# Patient Record
Sex: FEMALE | Race: BLACK OR AFRICAN AMERICAN | NOT HISPANIC OR LATINO | Employment: FULL TIME | URBAN - METROPOLITAN AREA
[De-identification: names, ages, dates, MRNs, and addresses within clinical notes are randomized per-mention and may not be internally consistent; named-entity substitution may affect disease eponyms.]

---

## 2023-05-17 LAB
ALANINE AMINOTRANSFERASE (SGPT) (U/L) IN SER/PLAS: 16 U/L (ref 7–45)
ALBUMIN (G/DL) IN SER/PLAS: 3.7 G/DL (ref 3.4–5)
ALKALINE PHOSPHATASE (U/L) IN SER/PLAS: 63 U/L (ref 33–110)
AMORPHOUS CRYSTALS, URINE: NORMAL /HPF
ANION GAP IN SER/PLAS: 13 MMOL/L (ref 10–20)
ANTI-DNA (DS): 1 IU/ML
ANTICARDIOLIPIN IGA ANTIBODY: 1.2 APL U/ML (ref 0–20)
ANTICARDIOLIPIN IGG ANTIBODY: <1.6 GPL U/ML (ref 0–20)
ANTICARDIOLIPIN IGM ANTIBODY: <0.2 MPL U/ML (ref 0–20)
APPEARANCE, URINE: ABNORMAL
ASPARTATE AMINOTRANSFERASE (SGOT) (U/L) IN SER/PLAS: 21 U/L (ref 9–39)
BACTERIA, URINE: NORMAL /HPF
BASOPHILS (10*3/UL) IN BLOOD BY AUTOMATED COUNT: 0.01 X10E9/L (ref 0–0.1)
BASOPHILS/100 LEUKOCYTES IN BLOOD BY AUTOMATED COUNT: 0.2 % (ref 0–2)
BETA 2 GLYCOPROTEIN 1 IGA AB IN SERUM: 1.3 U/ML (ref 0–20)
BETA 2 GLYCOPROTEIN 1 IGG AB IN SERUM: <1.4 U/ML (ref 0–20)
BETA 2 GLYCOPROTEIN 1 IGM ANTIBODY IN SERUM: <0.2 U/ML (ref 0–20)
BILIRUBIN TOTAL (MG/DL) IN SER/PLAS: 0.2 MG/DL (ref 0–1.2)
BILIRUBIN, URINE: NEGATIVE
BLOOD, URINE: NEGATIVE
BROAD CASTS, URINE: NORMAL /LPF
BUDDING YEAST, URINE: NORMAL /HPF
C REACTIVE PROTEIN (MG/L) IN SER/PLAS: 0.32 MG/DL
CALCIUM (MG/DL) IN SER/PLAS: 9.6 MG/DL (ref 8.6–10.6)
CALCIUM CARBONATE CRYSTALS, URINE: NORMAL /HPF
CALCIUM OXALATE CRYSTALS, URINE: NORMAL /HPF
CALCIUM PHOSPHATE CRYSTALS, URINE: NORMAL /HPF
CARBON DIOXIDE, TOTAL (MMOL/L) IN SER/PLAS: 24 MMOL/L (ref 21–32)
CHLORIDE (MMOL/L) IN SER/PLAS: 104 MMOL/L (ref 98–107)
COLOR, URINE: YELLOW
COMPLEMENT C3 (MG/DL) IN SER/PLAS: 153 MG/DL (ref 87–200)
COMPLEMENT C4 (MG/DL) IN SER/PLAS: 40 MG/DL (ref 10–50)
CREATININE (MG/DL) IN SER/PLAS: 0.51 MG/DL (ref 0.5–1.05)
CREATININE (MG/DL) IN URINE: 106 MG/DL (ref 20–320)
CYSTINE CRYSTALS, URINE: NORMAL /HPF
EOSINOPHILS (10*3/UL) IN BLOOD BY AUTOMATED COUNT: 0 X10E9/L (ref 0–0.7)
EOSINOPHILS/100 LEUKOCYTES IN BLOOD BY AUTOMATED COUNT: 0 % (ref 0–6)
EPITHELIAL CASTS, URINE: NORMAL /LPF
ERYTHROCYTE DISTRIBUTION WIDTH (RATIO) BY AUTOMATED COUNT: 13.4 % (ref 11.5–14.5)
ERYTHROCYTE MEAN CORPUSCULAR HEMOGLOBIN CONCENTRATION (G/DL) BY AUTOMATED: 31.4 G/DL (ref 32–36)
ERYTHROCYTE MEAN CORPUSCULAR VOLUME (FL) BY AUTOMATED COUNT: 86 FL (ref 80–100)
ERYTHROCYTES (10*6/UL) IN BLOOD BY AUTOMATED COUNT: 4.71 X10E12/L (ref 4–5.2)
FAT, URINE: NORMAL /HPF
FATTY CASTS, URINE: NORMAL /LPF
FINE GRANULAR CASTS, URINE: NORMAL /LPF
GFR FEMALE: >90 ML/MIN/1.73M2
GLUCOSE (MG/DL) IN SER/PLAS: 118 MG/DL (ref 74–99)
GLUCOSE, URINE: NEGATIVE MG/DL
HEMATOCRIT (%) IN BLOOD BY AUTOMATED COUNT: 40.7 % (ref 36–46)
HEMOGLOBIN (G/DL) IN BLOOD: 12.8 G/DL (ref 12–16)
HYALINE CASTS, URINE: NORMAL /LPF
IMMATURE GRANULOCYTES/100 LEUKOCYTES IN BLOOD BY AUTOMATED COUNT: 0.2 % (ref 0–0.9)
KETONES, URINE: ABNORMAL MG/DL
LEUCINE CRYSTALS, URINE: NORMAL /HPF
LEUKOCYTE ESTERASE, URINE: NEGATIVE
LEUKOCYTES (10*3/UL) IN BLOOD BY AUTOMATED COUNT: 4.4 X10E9/L (ref 4.4–11.3)
LYMPHOCYTES (10*3/UL) IN BLOOD BY AUTOMATED COUNT: 0.74 X10E9/L (ref 1.2–4.8)
LYMPHOCYTES/100 LEUKOCYTES IN BLOOD BY AUTOMATED COUNT: 17 % (ref 13–44)
MIXED CELLULAR CASTS, URINE: NORMAL /LPF
MONOCYTES (10*3/UL) IN BLOOD BY AUTOMATED COUNT: 0.1 X10E9/L (ref 0.1–1)
MONOCYTES/100 LEUKOCYTES IN BLOOD BY AUTOMATED COUNT: 2.3 % (ref 2–10)
MUCUS, URINE: NORMAL /LPF
NEUTROPHILS (10*3/UL) IN BLOOD BY AUTOMATED COUNT: 3.49 X10E9/L (ref 1.2–7.7)
NEUTROPHILS/100 LEUKOCYTES IN BLOOD BY AUTOMATED COUNT: 80.3 % (ref 40–80)
NITRITE, URINE: NEGATIVE
NRBC (PER 100 WBCS) BY AUTOMATED COUNT: 0 /100 WBC (ref 0–0)
OVAL FAT BODIES, URINE: NORMAL /HPF
PH, URINE: 5 (ref 5–8)
PLATELETS (10*3/UL) IN BLOOD AUTOMATED COUNT: 242 X10E9/L (ref 150–450)
POTASSIUM (MMOL/L) IN SER/PLAS: 4.3 MMOL/L (ref 3.5–5.3)
PROTEIN (MG/DL) IN URINE: 9 MG/DL (ref 5–24)
PROTEIN TOTAL: 8.7 G/DL (ref 6.4–8.2)
PROTEIN, URINE: NEGATIVE MG/DL
PROTEIN/CREATININE (MG/MG) IN URINE: 0.08 MG/MG CREAT (ref 0–0.17)
RBC CASTS, URINE: NORMAL /LPF
RBC CLUMPS, URINE: NORMAL /HPF
RBC, URINE: NORMAL
RENAL EPITHELIAL CELLS, URINE: NORMAL /HPF
SEDIMENTATION RATE, ERYTHROCYTE: 71 MM/H (ref 0–20)
SODIUM (MMOL/L) IN SER/PLAS: 137 MMOL/L (ref 136–145)
SPECIFIC GRAVITY, URINE: 1.02 (ref 1–1.03)
SPERMATOZOA, URINE: NORMAL /HPF
SQUAMOUS EPITHELIAL CELLS, URINE: NORMAL /HPF
TRANSITIONAL EPITHELIAL CELLS, URINE: NORMAL /HPF
TRICHOMONAS, URINE: NORMAL /HPF
TRIPLE PHOSPHATE CRYSTALS, URINE: NORMAL /HPF
TYROSINE CRYSTALS, URINE: NORMAL /HPF
UREA NITROGEN (MG/DL) IN SER/PLAS: 13 MG/DL (ref 6–23)
URIC ACID (URATE) CRYSTALS, URINE: NORMAL /HPF
URINALYSIS MICROSCOPIC COMMENT: NORMAL
UROBILINOGEN, URINE: <2 MG/DL (ref 0–1.9)
WAXY CASTS, URINE: NORMAL /LPF
WBC CASTS, URINE: NORMAL /LPF
WBC CLUMPS, URINE: NORMAL /HPF
WBC, URINE: NORMAL
YEAST HYPHAE, URINE: NORMAL /HPF

## 2023-05-18 LAB
DILUTE RUSSELL VIPER VENOM TIME CONF: 1.01 RATIO
DILUTE RUSSELL VIPER VENOM TIME SCREEN: 0.97 RATIO
DILUTE RUSSELL VIPER VENOM TIME TEST RATIO: 0.96 RATIO
LUPUS ANTICOAGULANT INTERPRETATION: NORMAL
NORMALIZED SILICA CLOTTING TIME (RATIO) OF PPP: 0.77 RATIO
SILICA CLOTTING TIME CONFIRMATION: 0.98 RATIO
SILICA CLOTTING TIME SCREEN: 0.76 RATIO

## 2023-09-25 PROBLEM — Z79.899 HIGH RISK MEDICATION USE: Status: ACTIVE | Noted: 2023-09-25

## 2023-09-25 PROBLEM — M05.9 SEROPOSITIVE RHEUMATOID ARTHRITIS (MULTI): Status: ACTIVE | Noted: 2023-09-25

## 2023-09-25 PROBLEM — D86.9 SARCOIDOSIS: Status: ACTIVE | Noted: 2023-09-25

## 2023-09-25 PROBLEM — R63.5 WEIGHT GAIN: Status: ACTIVE | Noted: 2023-09-25

## 2023-09-25 PROBLEM — M79.641 BILATERAL HAND PAIN: Status: ACTIVE | Noted: 2023-09-25

## 2023-09-25 PROBLEM — M79.642 BILATERAL HAND PAIN: Status: ACTIVE | Noted: 2023-09-25

## 2023-09-25 PROBLEM — M20.091 ULNAR DEVIATION OF FINGERS OF BOTH HANDS: Status: ACTIVE | Noted: 2023-09-25

## 2023-09-25 PROBLEM — K21.9 GERD (GASTROESOPHAGEAL REFLUX DISEASE): Status: ACTIVE | Noted: 2023-09-25

## 2023-09-25 PROBLEM — Z72.89 OTHER PROBLEMS RELATED TO LIFESTYLE: Status: ACTIVE | Noted: 2023-09-25

## 2023-09-25 PROBLEM — M20.092 ULNAR DEVIATION OF FINGERS OF BOTH HANDS: Status: ACTIVE | Noted: 2023-09-25

## 2023-09-25 RX ORDER — ADALIMUMAB 40MG/0.4ML
KIT SUBCUTANEOUS
COMMUNITY
Start: 2022-09-26 | End: 2023-10-02 | Stop reason: SDUPTHER

## 2023-09-25 RX ORDER — LEFLUNOMIDE 10 MG/1
10 TABLET ORAL DAILY
COMMUNITY

## 2023-09-25 RX ORDER — PREDNISONE 20 MG/1
1 TABLET ORAL DAILY
COMMUNITY
Start: 2022-09-26 | End: 2023-10-02 | Stop reason: SDUPTHER

## 2023-09-25 RX ORDER — FAMOTIDINE 20 MG/1
20 TABLET, FILM COATED ORAL NIGHTLY
COMMUNITY

## 2023-10-02 ENCOUNTER — OFFICE VISIT (OUTPATIENT)
Dept: RHEUMATOLOGY | Facility: CLINIC | Age: 38
End: 2023-10-02
Payer: COMMERCIAL

## 2023-10-02 VITALS
HEIGHT: 67 IN | WEIGHT: 190 LBS | BODY MASS INDEX: 29.82 KG/M2 | HEART RATE: 77 BPM | DIASTOLIC BLOOD PRESSURE: 84 MMHG | SYSTOLIC BLOOD PRESSURE: 118 MMHG

## 2023-10-02 DIAGNOSIS — Z79.899 HIGH RISK MEDICATION USE: ICD-10-CM

## 2023-10-02 DIAGNOSIS — D86.9 SARCOIDOSIS: ICD-10-CM

## 2023-10-02 DIAGNOSIS — M05.9 SEROPOSITIVE RHEUMATOID ARTHRITIS (MULTI): Primary | ICD-10-CM

## 2023-10-02 PROCEDURE — 99214 OFFICE O/P EST MOD 30 MIN: CPT | Performed by: STUDENT IN AN ORGANIZED HEALTH CARE EDUCATION/TRAINING PROGRAM

## 2023-10-02 RX ORDER — PREDNISONE 5 MG/1
TABLET ORAL
Qty: 306 TABLET | Refills: 0 | Status: SHIPPED | OUTPATIENT
Start: 2023-10-02 | End: 2024-02-11

## 2023-10-02 RX ORDER — ADALIMUMAB 40MG/0.4ML
1 KIT SUBCUTANEOUS
Qty: 12 EACH | Refills: 1 | Status: SHIPPED | OUTPATIENT
Start: 2023-10-02 | End: 2024-04-01 | Stop reason: SDUPTHER

## 2023-10-02 ASSESSMENT — PAIN SCALES - GENERAL: PAINLEVEL: 0-NO PAIN

## 2023-10-02 NOTE — PROGRESS NOTES
Subjective   Patient ID: David Snyder is a 38 y.o. female who presents for No chief complaint on file..    HPI    David Snyder is a 37 yo F w/a history of severe erosive, seropositive RA and intrathoracic sarcoidosis presenting for follow-up. Initial diagnosis 06/2022. Last seen 05/2023 at which time she was started on LEF for persistent inflammatory joint pain and synovitis w/elevated inflammatory markers. Maintained on Humira and prednisone at that time. APLA checked given that she is using depo-provera for contraception. She has several positive autoantibodies (BEN 1:10,240, low-titer + Sm, +RNP, +SSA, +anti-chromatin) but no evidence of systemic CTD apart from RA.     Today patient states she is using Humira, LEF, and prednisone as prescribed. Missed 3 days of prednisone and didn't feel any different. Due for Humira this week and is out of refills. No injection site reactions w/Humira. No GI upset w/LEF. Overall feels good but still having pain and stiffness and in the AM. Stiffness lasts ~ 5 minutes. If she bumps her hands they will swell but otherwise no joint swelling. Knees hurt when it rains but otherwise no joint pain or stiffness. She had rhinorrhea for one week followed by dry cough that has lasted 2 weeks. Cough is worse at night when she's lying down. Denies fever. No other illness or infection. Feels Humira lasts ~ 7 days.     Current immunosuppression:  - LEF 10mg daily  - Humira 40mg subcutaneous every other week (started 02/2023)  - Prednisone 20mg daily    Prior immunosuppression:  - Methotrexate 6 tabs weekly (07/2022 - 09/2022), discontinue as patient didn't tolerate folic acid d/t side effects    Review of Systems  Constitutional: Denies fever, chills  Eyes: Denies dry eyes, redness of the eyes, pain in the eyes   ENT: Denies dry mouth, sores in the mouth, poor dentition   Cardiovascular: Denies chest pain, lower extremity edema  Respiratory: Denies shortness of breath,  hemoptysis  Gastrointestinal: Denies abdominal pain, N/V/D  Integumentary: Denies rash, photosensitivity  Neurological: Denies any numbness, tingling, focal weakness   Hematologic/Lymphatic: Denies bleeding, easy bruising, Raynaud's phenomena   MSK: As per HPI       Objective     Vitals:    10/02/23 0916   BP: 118/84   Pulse: 77     Physical Exam  General: Cooperative, in NAD   Eyes: Conjunctiva clear, sclera white, anicteric   Lungs: No respiratory distress; lungs CTAB; no wheezes, rales, rhonchi, or stridor   Heart: Normal S1 and S2; regular rate and rhythm; no murmurs, rubs, or gallops  Skin: No rashes, ulcers, tophi, or nodules noted  MSK:   Upper Extremities:   Hands: Chronic deformities including fixed flexion and ulnar deviation at the MCPs and multiple swan-neck and boutonierre deformities; no active synovitis; good fist and fair  on the R, poor fist and  on the L  Wrists: No erythema, warmth, synovitis, or pain of the wrists; normal ROM  Elbows: No erythema, warmth, synovitis, or pain; normal ROM   Shoulders: No erythema, warmth, appreciable swelling, or pain; normal ROM   Lower Extremities:   Knees: No erythema, warmth, swelling, or pain; normal ROM   Ankles: No erythema, warmth, synovitis, or pain; normal ROM  Feet: No gross deformity, erythema, or swelling; MTP squeeze negative bilaterally        Assessment/Plan   Diagnoses and all orders for this visit:  Seropositive rheumatoid arthritis (CMS/[facility])  -     Comprehensive Metabolic Panel; Future  -     C-Reactive Protein; Future  -     Sedimentation Rate; Future  -     predniSONE (Deltasone) 5 mg tablet; Take 3.5 tablets (17.5 mg) by mouth once daily for 14 days, THEN 3 tablets (15 mg) once daily for 14 days, THEN 2.5 tablets (12.5 mg) once daily for 14 days, THEN 2 tablets (10 mg) once daily.  -     adalimumab (Humira,CF, Pen) 40 mg/0.4 mL pen injector kit pen-injector; Inject 1 Pen (40 mg) under the skin 1 (one) time per week.  Sarcoidosis  High  risk medication use  -     CBC and Auto Differential; Future  -     Comprehensive Metabolic Panel; Future       David Sndyer is a 39 yo F w/a history of severe erosive, seropositive RA and intrathoracic sarcoidosis presenting for follow-up. She has no active synovitis on exam today but remains on prednisone 20mg daily and has weaning efficacy of Humira after ~ 1 week. We will increase Humira from 40mg subcutaneous every other week to 40mg subcutaneous weekly. Taper prednisone by 2.5mg every 2 weeks until at 10mg daily where she will remain until follow-up. Continue LEF 10mg daily. Could consider increasing to 20mg daily in the future if tolerated and if unable to wean off prednisone. She has several positive autoantibodies as above but no other evidence of systemic CTD. She is asymptomatic from a sarcoidosis standpoint. Obtain surveillance labs today and RTC 2-3 mos.    Patient seen and discussed with attending Dr. Shabazz.    Keisha Sykes MD  PGY-V, Rheumatology    I  personally saw and examined this patient.  I agree with the fellow's HPI, Physical exam, and Assessment and Plan, as detailed in the note, and these were discussed with the patient, who expressed understanding.  All questions were answered.    Justice Shabazz M.D.

## 2023-10-03 ENCOUNTER — SPECIALTY PHARMACY (OUTPATIENT)
Dept: PHARMACY | Facility: CLINIC | Age: 38
End: 2023-10-03

## 2023-10-03 ENCOUNTER — PHARMACY VISIT (OUTPATIENT)
Dept: PHARMACY | Facility: CLINIC | Age: 38
End: 2023-10-03
Payer: MEDICAID

## 2023-10-03 PROCEDURE — RXMED WILLOW AMBULATORY MEDICATION CHARGE

## 2023-10-04 ENCOUNTER — SPECIALTY PHARMACY (OUTPATIENT)
Dept: PHARMACY | Facility: CLINIC | Age: 38
End: 2023-10-04

## 2023-10-12 ENCOUNTER — LAB (OUTPATIENT)
Dept: LAB | Facility: LAB | Age: 38
End: 2023-10-12
Payer: COMMERCIAL

## 2023-10-12 DIAGNOSIS — Z79.899 HIGH RISK MEDICATION USE: ICD-10-CM

## 2023-10-12 DIAGNOSIS — M05.9 SEROPOSITIVE RHEUMATOID ARTHRITIS (MULTI): ICD-10-CM

## 2023-10-12 LAB
ALBUMIN SERPL BCP-MCNC: 3.9 G/DL (ref 3.4–5)
ALP SERPL-CCNC: 60 U/L (ref 33–110)
ALT SERPL W P-5'-P-CCNC: 13 U/L (ref 7–45)
ANION GAP SERPL CALC-SCNC: 11 MMOL/L (ref 10–20)
AST SERPL W P-5'-P-CCNC: 19 U/L (ref 9–39)
BASOPHILS # BLD AUTO: 0.03 X10*3/UL (ref 0–0.1)
BASOPHILS NFR BLD AUTO: 0.6 %
BILIRUB SERPL-MCNC: 0.3 MG/DL (ref 0–1.2)
BUN SERPL-MCNC: 11 MG/DL (ref 6–23)
CALCIUM SERPL-MCNC: 9.3 MG/DL (ref 8.6–10.6)
CHLORIDE SERPL-SCNC: 107 MMOL/L (ref 98–107)
CO2 SERPL-SCNC: 27 MMOL/L (ref 21–32)
CREAT SERPL-MCNC: 0.62 MG/DL (ref 0.5–1.05)
CRP SERPL-MCNC: 0.73 MG/DL
EOSINOPHIL # BLD AUTO: 0.1 X10*3/UL (ref 0–0.7)
EOSINOPHIL NFR BLD AUTO: 1.9 %
ERYTHROCYTE [DISTWIDTH] IN BLOOD BY AUTOMATED COUNT: 12.8 % (ref 11.5–14.5)
ERYTHROCYTE [SEDIMENTATION RATE] IN BLOOD BY WESTERGREN METHOD: 12 MM/H (ref 0–20)
GFR SERPL CREATININE-BSD FRML MDRD: >90 ML/MIN/1.73M*2
GLUCOSE SERPL-MCNC: 84 MG/DL (ref 74–99)
HCT VFR BLD AUTO: 37.2 % (ref 36–46)
HGB BLD-MCNC: 11.4 G/DL (ref 12–16)
IMM GRANULOCYTES # BLD AUTO: 0.01 X10*3/UL (ref 0–0.7)
IMM GRANULOCYTES NFR BLD AUTO: 0.2 % (ref 0–0.9)
LYMPHOCYTES # BLD AUTO: 2.02 X10*3/UL (ref 1.2–4.8)
LYMPHOCYTES NFR BLD AUTO: 37.7 %
MCH RBC QN AUTO: 28.6 PG (ref 26–34)
MCHC RBC AUTO-ENTMCNC: 30.6 G/DL (ref 32–36)
MCV RBC AUTO: 93 FL (ref 80–100)
MONOCYTES # BLD AUTO: 0.46 X10*3/UL (ref 0.1–1)
MONOCYTES NFR BLD AUTO: 8.6 %
NEUTROPHILS # BLD AUTO: 2.74 X10*3/UL (ref 1.2–7.7)
NEUTROPHILS NFR BLD AUTO: 51 %
NRBC BLD-RTO: 0 /100 WBCS (ref 0–0)
PLATELET # BLD AUTO: 226 X10*3/UL (ref 150–450)
PMV BLD AUTO: 12.3 FL (ref 7.5–11.5)
POTASSIUM SERPL-SCNC: 4 MMOL/L (ref 3.5–5.3)
PROT SERPL-MCNC: 7.8 G/DL (ref 6.4–8.2)
RBC # BLD AUTO: 3.99 X10*6/UL (ref 4–5.2)
SODIUM SERPL-SCNC: 141 MMOL/L (ref 136–145)
WBC # BLD AUTO: 5.4 X10*3/UL (ref 4.4–11.3)

## 2023-10-12 PROCEDURE — 85652 RBC SED RATE AUTOMATED: CPT

## 2023-10-12 PROCEDURE — 85025 COMPLETE CBC W/AUTO DIFF WBC: CPT

## 2023-10-12 PROCEDURE — 86140 C-REACTIVE PROTEIN: CPT

## 2023-10-12 PROCEDURE — 80053 COMPREHEN METABOLIC PANEL: CPT

## 2023-10-12 PROCEDURE — 36415 COLL VENOUS BLD VENIPUNCTURE: CPT

## 2023-10-17 ENCOUNTER — SPECIALTY PHARMACY (OUTPATIENT)
Dept: PHARMACY | Facility: CLINIC | Age: 38
End: 2023-10-17

## 2023-10-30 NOTE — PROGRESS NOTES
I saw and evaluated the patient. I personally obtained the key and critical portions of the history and physical exam or was physically present for key and critical portions performed by the resident/fellow. I reviewed the resident/fellow's documentation and discussed the patient with the resident/fellow. I agree with the resident/fellow's medical decision making as documented in the note.  .

## 2023-11-03 ENCOUNTER — PHARMACY VISIT (OUTPATIENT)
Dept: PHARMACY | Facility: CLINIC | Age: 38
End: 2023-11-03
Payer: MEDICAID

## 2023-11-03 PROCEDURE — RXMED WILLOW AMBULATORY MEDICATION CHARGE

## 2023-11-30 ENCOUNTER — PHARMACY VISIT (OUTPATIENT)
Dept: PHARMACY | Facility: CLINIC | Age: 38
End: 2023-11-30
Payer: MEDICAID

## 2023-11-30 ENCOUNTER — SPECIALTY PHARMACY (OUTPATIENT)
Dept: PHARMACY | Facility: CLINIC | Age: 38
End: 2023-11-30

## 2023-11-30 PROCEDURE — RXMED WILLOW AMBULATORY MEDICATION CHARGE

## 2024-01-12 ENCOUNTER — SPECIALTY PHARMACY (OUTPATIENT)
Dept: PHARMACY | Facility: CLINIC | Age: 39
End: 2024-01-12

## 2024-01-12 PROCEDURE — RXMED WILLOW AMBULATORY MEDICATION CHARGE

## 2024-01-16 ENCOUNTER — PHARMACY VISIT (OUTPATIENT)
Dept: PHARMACY | Facility: CLINIC | Age: 39
End: 2024-01-16
Payer: MEDICAID

## 2024-02-08 ENCOUNTER — SPECIALTY PHARMACY (OUTPATIENT)
Dept: PHARMACY | Facility: CLINIC | Age: 39
End: 2024-02-08

## 2024-02-08 PROCEDURE — RXMED WILLOW AMBULATORY MEDICATION CHARGE

## 2024-02-12 ENCOUNTER — PHARMACY VISIT (OUTPATIENT)
Dept: PHARMACY | Facility: CLINIC | Age: 39
End: 2024-02-12
Payer: MEDICAID

## 2024-03-08 ENCOUNTER — SPECIALTY PHARMACY (OUTPATIENT)
Dept: PHARMACY | Facility: CLINIC | Age: 39
End: 2024-03-08

## 2024-03-08 PROCEDURE — RXMED WILLOW AMBULATORY MEDICATION CHARGE

## 2024-03-11 ENCOUNTER — PHARMACY VISIT (OUTPATIENT)
Dept: PHARMACY | Facility: CLINIC | Age: 39
End: 2024-03-11
Payer: MEDICAID

## 2024-04-01 DIAGNOSIS — M05.9 SEROPOSITIVE RHEUMATOID ARTHRITIS (MULTI): ICD-10-CM

## 2024-04-01 RX ORDER — ADALIMUMAB 40MG/0.4ML
1 KIT SUBCUTANEOUS
Qty: 12 EACH | Refills: 1 | Status: CANCELLED | OUTPATIENT
Start: 2024-04-01 | End: 2024-06-30

## 2024-04-04 DIAGNOSIS — M05.9 SEROPOSITIVE RHEUMATOID ARTHRITIS (MULTI): ICD-10-CM

## 2024-04-04 PROCEDURE — RXMED WILLOW AMBULATORY MEDICATION CHARGE

## 2024-04-04 RX ORDER — ADALIMUMAB 40MG/0.4ML
1 KIT SUBCUTANEOUS
Qty: 12 EACH | Refills: 1 | Status: SHIPPED | OUTPATIENT
Start: 2024-04-04 | End: 2024-07-12

## 2024-04-09 ENCOUNTER — SPECIALTY PHARMACY (OUTPATIENT)
Dept: PHARMACY | Facility: CLINIC | Age: 39
End: 2024-04-09

## 2024-04-15 ENCOUNTER — SPECIALTY PHARMACY (OUTPATIENT)
Dept: PHARMACY | Facility: CLINIC | Age: 39
End: 2024-04-15

## 2024-04-15 NOTE — PROGRESS NOTES
[facility] Clinical Note    David Snyder is a 39 y.o. female, who is on the specialty pharmacy service of: Rheumatology Core.  David Snyder is taking: Humira.     David was contacted on 4/15/2024.    Refer to the encounter summary report for documentation details about patient counseling and education.      Impression/Plan  Is patient high risk? (potential patients:  pregnancy, geriatric, pediatric) No    Is laboratory follow-up needed? No  Is a clinical intervention needed?  No  Next assessment date?  ~10/15/2024  Additional comments:    Medication Adherence  The importance of adherence was discussed with the patient and they were advised to take the medication as prescribed by their provider. David was encouraged to call her physician's office if they have a question regarding a missed dose.        Patient advised to contact the pharmacy if there are any changes to her medication list, including prescriptions, OTC medications, herbal products, or supplements. Patient was advised of [facility]’s dispensing process, refill timeline, contact information ([phone]), and patient management follow up. Patient confirmed understanding of education conducted during assessment. All patient questions and concerns were addressed to the best of my ability. Patient was encouraged to contact the specialty pharmacy with any questions or concerns.    Confirmed follow-up outreaches are properly scheduled. Reviewed goals of therapy in the program targets.    Mercedes Acuna, PharmD

## 2024-04-19 ENCOUNTER — PHARMACY VISIT (OUTPATIENT)
Dept: PHARMACY | Facility: CLINIC | Age: 39
End: 2024-04-19
Payer: MEDICAID

## 2024-06-10 ENCOUNTER — APPOINTMENT (OUTPATIENT)
Dept: RHEUMATOLOGY | Facility: CLINIC | Age: 39
End: 2024-06-10
Payer: COMMERCIAL

## 2024-06-10 DIAGNOSIS — Z79.899 HIGH RISK MEDICATION USE: ICD-10-CM

## 2024-06-10 DIAGNOSIS — M05.9 SEROPOSITIVE RHEUMATOID ARTHRITIS (MULTI): Primary | ICD-10-CM

## 2024-06-10 DIAGNOSIS — D86.9 SARCOIDOSIS: ICD-10-CM

## 2024-06-29 DIAGNOSIS — M05.9 SEROPOSITIVE RHEUMATOID ARTHRITIS (MULTI): ICD-10-CM

## 2024-06-29 DIAGNOSIS — K21.9 GASTRO-ESOPHAGEAL REFLUX DISEASE WITHOUT ESOPHAGITIS: ICD-10-CM

## 2024-07-01 RX ORDER — FAMOTIDINE 20 MG/1
20 TABLET, FILM COATED ORAL NIGHTLY
Qty: 30 TABLET | Refills: 5 | Status: SHIPPED | OUTPATIENT
Start: 2024-07-01

## 2024-07-02 RX ORDER — PREDNISONE 5 MG/1
10 TABLET ORAL DAILY
Qty: 20 TABLET | Refills: 0 | Status: SHIPPED | OUTPATIENT
Start: 2024-07-02

## 2024-07-08 ENCOUNTER — SPECIALTY PHARMACY (OUTPATIENT)
Dept: PHARMACY | Facility: CLINIC | Age: 39
End: 2024-07-08

## 2024-07-08 PROCEDURE — RXMED WILLOW AMBULATORY MEDICATION CHARGE

## 2024-07-09 ENCOUNTER — PHARMACY VISIT (OUTPATIENT)
Dept: PHARMACY | Facility: CLINIC | Age: 39
End: 2024-07-09
Payer: MEDICAID

## 2024-07-11 ENCOUNTER — APPOINTMENT (OUTPATIENT)
Dept: RHEUMATOLOGY | Facility: CLINIC | Age: 39
End: 2024-07-11
Payer: COMMERCIAL

## 2024-07-11 ENCOUNTER — LAB (OUTPATIENT)
Dept: LAB | Facility: LAB | Age: 39
End: 2024-07-11
Payer: COMMERCIAL

## 2024-07-11 VITALS
HEIGHT: 67 IN | BODY MASS INDEX: 29.98 KG/M2 | DIASTOLIC BLOOD PRESSURE: 98 MMHG | WEIGHT: 191 LBS | HEART RATE: 70 BPM | SYSTOLIC BLOOD PRESSURE: 124 MMHG

## 2024-07-11 DIAGNOSIS — M05.9 SEROPOSITIVE RHEUMATOID ARTHRITIS (MULTI): ICD-10-CM

## 2024-07-11 DIAGNOSIS — D86.9 SARCOIDOSIS: ICD-10-CM

## 2024-07-11 DIAGNOSIS — M05.9 SEROPOSITIVE RHEUMATOID ARTHRITIS (MULTI): Primary | ICD-10-CM

## 2024-07-11 DIAGNOSIS — Z79.899 HIGH RISK MEDICATION USE: ICD-10-CM

## 2024-07-11 LAB
ALBUMIN SERPL BCP-MCNC: 3.8 G/DL (ref 3.4–5)
ALP SERPL-CCNC: 61 U/L (ref 33–110)
ALT SERPL W P-5'-P-CCNC: 10 U/L (ref 7–45)
ANION GAP SERPL CALC-SCNC: 16 MMOL/L (ref 10–20)
AST SERPL W P-5'-P-CCNC: 25 U/L (ref 9–39)
BASOPHILS # BLD AUTO: 0.03 X10*3/UL (ref 0–0.1)
BASOPHILS NFR BLD AUTO: 0.5 %
BILIRUB SERPL-MCNC: 0.2 MG/DL (ref 0–1.2)
BUN SERPL-MCNC: 10 MG/DL (ref 6–23)
CALCIUM SERPL-MCNC: 9.6 MG/DL (ref 8.6–10.6)
CHLORIDE SERPL-SCNC: 106 MMOL/L (ref 98–107)
CO2 SERPL-SCNC: 19 MMOL/L (ref 21–32)
CREAT SERPL-MCNC: 0.58 MG/DL (ref 0.5–1.05)
CRP SERPL-MCNC: 0.41 MG/DL
EGFRCR SERPLBLD CKD-EPI 2021: >90 ML/MIN/1.73M*2
EOSINOPHIL # BLD AUTO: 0.01 X10*3/UL (ref 0–0.7)
EOSINOPHIL NFR BLD AUTO: 0.2 %
ERYTHROCYTE [DISTWIDTH] IN BLOOD BY AUTOMATED COUNT: 13.2 % (ref 11.5–14.5)
GLUCOSE SERPL-MCNC: 66 MG/DL (ref 74–99)
HCT VFR BLD AUTO: 38.7 % (ref 36–46)
HGB BLD-MCNC: 11.8 G/DL (ref 12–16)
IMM GRANULOCYTES # BLD AUTO: 0.01 X10*3/UL (ref 0–0.7)
IMM GRANULOCYTES NFR BLD AUTO: 0.2 % (ref 0–0.9)
LYMPHOCYTES # BLD AUTO: 1.55 X10*3/UL (ref 1.2–4.8)
LYMPHOCYTES NFR BLD AUTO: 25.4 %
MCH RBC QN AUTO: 28.9 PG (ref 26–34)
MCHC RBC AUTO-ENTMCNC: 30.5 G/DL (ref 32–36)
MCV RBC AUTO: 95 FL (ref 80–100)
MONOCYTES # BLD AUTO: 0.51 X10*3/UL (ref 0.1–1)
MONOCYTES NFR BLD AUTO: 8.3 %
NEUTROPHILS # BLD AUTO: 4 X10*3/UL (ref 1.2–7.7)
NEUTROPHILS NFR BLD AUTO: 65.4 %
NRBC BLD-RTO: 0 /100 WBCS (ref 0–0)
PLATELET # BLD AUTO: 211 X10*3/UL (ref 150–450)
POTASSIUM SERPL-SCNC: 4.9 MMOL/L (ref 3.5–5.3)
PROT SERPL-MCNC: 8.7 G/DL (ref 6.4–8.2)
RBC # BLD AUTO: 4.08 X10*6/UL (ref 4–5.2)
SODIUM SERPL-SCNC: 136 MMOL/L (ref 136–145)
WBC # BLD AUTO: 6.1 X10*3/UL (ref 4.4–11.3)

## 2024-07-11 PROCEDURE — 1036F TOBACCO NON-USER: CPT | Performed by: INTERNAL MEDICINE

## 2024-07-11 PROCEDURE — 86140 C-REACTIVE PROTEIN: CPT

## 2024-07-11 PROCEDURE — 85025 COMPLETE CBC W/AUTO DIFF WBC: CPT

## 2024-07-11 PROCEDURE — 80053 COMPREHEN METABOLIC PANEL: CPT

## 2024-07-11 PROCEDURE — 36415 COLL VENOUS BLD VENIPUNCTURE: CPT

## 2024-07-11 PROCEDURE — 99214 OFFICE O/P EST MOD 30 MIN: CPT | Performed by: INTERNAL MEDICINE

## 2024-07-11 RX ORDER — LEFLUNOMIDE 20 MG/1
20 TABLET ORAL DAILY
Qty: 30 TABLET | Refills: 3 | Status: SHIPPED | OUTPATIENT
Start: 2024-07-11 | End: 2024-11-08

## 2024-07-11 RX ORDER — PREDNISONE 1 MG/1
4 TABLET ORAL DAILY
Qty: 480 TABLET | Refills: 0 | Status: SHIPPED | OUTPATIENT
Start: 2024-07-11 | End: 2024-11-08

## 2024-07-11 ASSESSMENT — PAIN SCALES - GENERAL: PAINLEVEL: 0-NO PAIN

## 2024-07-11 NOTE — PROGRESS NOTES
Subjective   Patient ID: David Snyder is a 39 y.o. female who presents for New Patient Visit (Referred for Seropositive Rheumatoid arthritis).    HPI  David Snyder is a 38 yo F w/a history of severe erosive, seropositive RA and intrathoracic sarcoidosis presenting for follow-up. Initial diagnosis 06/2022. Last seen 05/2023 at which time she was started on LEF for persistent inflammatory joint pain and synovitis w/elevated inflammatory markers. Maintained on Humira and prednisone at that time. APLA checked given that she is using depo-provera for contraception. She has several positive autoantibodies (BEN 1:10,240, low-titer + Sm, +RNP, +SSA, +anti-chromatin) but no evidence of systemic CTD apart from RA.      Today patient states she is using Humira, LEF, and prednisone as prescribed. Missed 3 days of prednisone and didn't feel any different. Due for Humira this week and is out of refills. No injection site reactions w/Humira. No GI upset w/LEF. Overall feels good but still having pain and stiffness and in the AM. Stiffness lasts ~ 5 minutes. If she bumps her hands they will swell but otherwise no joint swelling. Knees hurt when it rains but otherwise no joint pain or stiffness. She had rhinorrhea for one week followed by dry cough that has lasted 2 weeks. Cough is worse at night when she's lying down. Denies fever. No other illness or infection. Feels Humira lasts ~ 7 days.     07/2024:  She tapered down her PRD to 5 mg daily  She has chronic joint problem, but no active joint pain.  Almost no AM stiffness  She denies any pulmonary symptoms  No skin rashes     Current DMARDS:  - LEF 10mg daily  - Humira 40mg subcutaneous every other week (started 02/2023)  - Prednisone  5 mg daily     Prior immunosuppression:  - Methotrexate 6 tabs weekly (07/2022 - 09/2022), discontinue as patient didn't tolerate folic acid d/t side effects    ROS  Joint pain in hands: negative   Joint swelling: negative  Morning stiffness and  duration: negative   strength: normal  Oral ulcer: negative  Genital ulcer: negative  Raynaud phenomenon: negative  Chest pain/dyspnea: negative  Low back pain: negative  Visual problem: negative  Dry eyes/dry mouth: negative  Skin rash/scaling/psoriasis: negative       Objective     PEXAM  VS reviewed, WNL  General: Alert, no distress   HEENT: Normocephalic/atraumatic, No alopecia. PERRLA. Sclera white, conjunctiva pink, no malar rash. no oral or nasal ulcer. Oral cavity pink and moist, no erythema or exudate, dentition good.   Neck: supple  Respiratory: CTA B, no adventitious breath sounds  Cardiac: RRR, no murmurs, carotid, or bruits  Abdominal: symmetrical, soft, non-tender, non-distended, normoactive BSx4 quadrants, no CVA tenderness or suprapubic tenderness  MSK: Joints of upper and lower extremities were assessed for synovitis and ROM.    +ulnar deviation especially at right hand and boutounirer deformities in some fingers, B/L mild flexion   Extremities: no clubbing, no cyanosis, no edema  Skin: Skin warm and moist.   Neuro: non-focal, Strength 5/5 throughout. Normal gait. No cerebellar pathologic exam     Assessment/Plan   Seropositive rheumatoid arthritis (CMS/HCC)  She tapered down her PRD and no severe flare  PExam showed ulnar deviation especially at right hand and boutounirer deformities in some fingers, B/L mild flexion contracture, no active synovitis.  -will increase her LEF to 20 mg daily  -will taper her PRD 1 mg month  -will continue Humira once a week  -will see her in 4 months  Sarcoid is stable, no active pulmonary symptoms

## 2024-08-05 ENCOUNTER — APPOINTMENT (OUTPATIENT)
Dept: PRIMARY CARE | Facility: CLINIC | Age: 39
End: 2024-08-05
Payer: COMMERCIAL

## 2024-08-08 ENCOUNTER — APPOINTMENT (OUTPATIENT)
Dept: PRIMARY CARE | Facility: CLINIC | Age: 39
End: 2024-08-08
Payer: COMMERCIAL

## 2024-09-15 DIAGNOSIS — Z30.42 ENCOUNTER FOR SURVEILLANCE OF INJECTABLE CONTRACEPTIVE: ICD-10-CM

## 2024-09-16 RX ORDER — CALCIUM CARBONATE/VITAMIN D3 600MG-5MCG
1 TABLET ORAL DAILY
Qty: 30 TABLET | Refills: 0 | Status: SHIPPED | OUTPATIENT
Start: 2024-09-16

## 2024-10-01 ENCOUNTER — SPECIALTY PHARMACY (OUTPATIENT)
Dept: PHARMACY | Facility: CLINIC | Age: 39
End: 2024-10-01

## 2024-10-01 DIAGNOSIS — M05.9 SEROPOSITIVE RHEUMATOID ARTHRITIS: ICD-10-CM

## 2024-10-02 RX ORDER — ADALIMUMAB 40MG/0.4ML
1 KIT SUBCUTANEOUS
Qty: 12 EACH | Refills: 1 | Status: SHIPPED | OUTPATIENT
Start: 2024-10-06 | End: 2025-01-04

## 2024-10-03 ENCOUNTER — SPECIALTY PHARMACY (OUTPATIENT)
Dept: PHARMACY | Facility: CLINIC | Age: 39
End: 2024-10-03

## 2024-10-14 PROCEDURE — RXMED WILLOW AMBULATORY MEDICATION CHARGE

## 2024-10-15 ENCOUNTER — TELEMEDICINE CLINICAL SUPPORT (OUTPATIENT)
Dept: PHARMACY | Facility: HOSPITAL | Age: 39
End: 2024-10-15
Payer: COMMERCIAL

## 2024-10-15 NOTE — PROGRESS NOTES
"[facility] Clinical Note    David Snyder is a 39 y.o. female, who is on the specialty pharmacy service for management of:  Rheumatology Core.    David Snyder is taking: Humira 40 mg subcutaneous once per week.    Medication Receipt Date: 10/2/24  Medication Start Date (planned or actual): 10/6/24    David was contacted on 10/15/2024 at 3:16 PM for a virtual pharmacy visit with encounter number [id number] from:   [facility]  [id number]  [address]  [facility] [address]  Dept: [phone]  Dept Fax: [phone]  Loc: [phone]    David was offered a Telemedicine Video visit or Telephone visit.  David consented to a Telemedicine visit, which was performed.    The most recent encounter visit with the referring prescriber Dr. Tucker on 7/11/24 was reviewed.  Pharmacy will continue to collaborate in the care of this patient with the referring prescriber Dr. Tucker.    General Assessment  Changes in home medications (OTCs, Supplements, Rxs), allergies, or health conditions: No      Specialty Medication in review: Humira 40 mg subcutaneous once a week    TOLERANCE: No side effects noted     EFFICACY: \"working pretty well\"     GOALS: Decrease RA disease activity - decrease joint pain, tenderness, swelling, and flares     COMPLIANCE / ADHERENCE:     Any barriers to adherence: No     Any barriers to self-administration (including physical and mental)? No        PATIENT MANAGEMENT:  Patient has no questions regarding medications or care. Patient voices no concerns with access to medications at this time.      Impression/Plan  IMPRESSION/PLAN:  Is patient high risk (potential patients:  pregnancy, geriatric, pediatric)? No   Is laboratory follow-up needed? No  Is a clinical intervention needed? No  Next reassessment date? 4/15/2025  Additional comments:   ALL labs past month:   No visits with results within 1 Month(s) from this visit. "   Latest known visit with results is:   Lab on 07/11/2024   Component Date Value Ref Range Status    C-Reactive Protein 07/11/2024 0.41  <1.00 mg/dL Final    WBC 07/11/2024 6.1  4.4 - 11.3 x10*3/uL Final    nRBC 07/11/2024 0.0  0.0 - 0.0 /100 WBCs Final    RBC 07/11/2024 4.08  4.00 - 5.20 x10*6/uL Final    Hemoglobin 07/11/2024 11.8 (L)  12.0 - 16.0 g/dL Final    Hematocrit 07/11/2024 38.7  36.0 - 46.0 % Final    MCV 07/11/2024 95  80 - 100 fL Final    MCH 07/11/2024 28.9  26.0 - 34.0 pg Final    MCHC 07/11/2024 30.5 (L)  32.0 - 36.0 g/dL Final    RDW 07/11/2024 13.2  11.5 - 14.5 % Final    Platelets 07/11/2024 211  150 - 450 x10*3/uL Final    Neutrophils % 07/11/2024 65.4  40.0 - 80.0 % Final    Immature Granulocytes %, Automated 07/11/2024 0.2  0.0 - 0.9 % Final    Lymphocytes % 07/11/2024 25.4  13.0 - 44.0 % Final    Monocytes % 07/11/2024 8.3  2.0 - 10.0 % Final    Eosinophils % 07/11/2024 0.2  0.0 - 6.0 % Final    Basophils % 07/11/2024 0.5  0.0 - 2.0 % Final    Neutrophils Absolute 07/11/2024 4.00  1.20 - 7.70 x10*3/uL Final    Immature Granulocytes Absolute, Au* 07/11/2024 0.01  0.00 - 0.70 x10*3/uL Final    Lymphocytes Absolute 07/11/2024 1.55  1.20 - 4.80 x10*3/uL Final    Monocytes Absolute 07/11/2024 0.51  0.10 - 1.00 x10*3/uL Final    Eosinophils Absolute 07/11/2024 0.01  0.00 - 0.70 x10*3/uL Final    Basophils Absolute 07/11/2024 0.03  0.00 - 0.10 x10*3/uL Final    Glucose 07/11/2024 66 (L)  74 - 99 mg/dL Final    Sodium 07/11/2024 136  136 - 145 mmol/L Final    Potassium 07/11/2024 4.9  3.5 - 5.3 mmol/L Final    Chloride 07/11/2024 106  98 - 107 mmol/L Final    Bicarbonate 07/11/2024 19 (L)  21 - 32 mmol/L Final    Anion Gap 07/11/2024 16  10 - 20 mmol/L Final    Urea Nitrogen 07/11/2024 10  6 - 23 mg/dL Final    Creatinine 07/11/2024 0.58  0.50 - 1.05 mg/dL Final    eGFR 07/11/2024 >90  >60 mL/min/1.73m*2 Final    Calcium 07/11/2024 9.6  8.6 - 10.6 mg/dL Final    Albumin 07/11/2024 3.8  3.4 - 5.0 g/dL  "Final    Alkaline Phosphatase 07/11/2024 61  33 - 110 U/L Final    Total Protein 07/11/2024 8.7 (H)  6.4 - 8.2 g/dL Final    AST 07/11/2024 25  9 - 39 U/L Final    Bilirubin, Total 07/11/2024 0.2  0.0 - 1.2 mg/dL Final    ALT 07/11/2024 10  7 - 45 U/L Final       Refer to the encounter summary report for documentation details about patient counseling and education.      Medication Adherence    The importance of adherence was discussed with the patient and they were advised to take the medication as prescribed by their provider. Patient was encouraged to call their physician's office if they have a question regarding a missed dose.     QOL/Patient Satisfaction  Rate your quality of life on scale of 1-10: 8  Rate your satisfaction with  Specialty Pharmacy on scale of 1-10: -- (\"Doesn't need anything right now\")      Patient was advised to contact the pharmacy if there are any changes to their medication list, including prescriptions, OTC medications, herbal products, or supplements. Patient was advised of [facility]'s dispensing process, refill timeline, contact information ([phone]), and patient management follow up. Patient confirmed understanding of education conducted during assessment. All patient questions and concerns were addressed to the best of my ability. Patient was encouraged to contact the specialty pharmacy with any questions or concerns.    Confirmed follow-up outreaches are properly scheduled and reviewed goals of therapy with the patient.        MARIAMA JAMES  "

## 2024-10-16 ENCOUNTER — PHARMACY VISIT (OUTPATIENT)
Dept: PHARMACY | Facility: CLINIC | Age: 39
End: 2024-10-16
Payer: MEDICAID

## 2024-11-09 ENCOUNTER — SPECIALTY PHARMACY (OUTPATIENT)
Dept: PHARMACY | Facility: CLINIC | Age: 39
End: 2024-11-09

## 2024-11-09 PROCEDURE — RXMED WILLOW AMBULATORY MEDICATION CHARGE

## 2024-11-12 ENCOUNTER — PHARMACY VISIT (OUTPATIENT)
Dept: PHARMACY | Facility: CLINIC | Age: 39
End: 2024-11-12
Payer: MEDICAID

## 2024-11-14 ENCOUNTER — APPOINTMENT (OUTPATIENT)
Dept: RHEUMATOLOGY | Facility: CLINIC | Age: 39
End: 2024-11-14
Payer: COMMERCIAL

## 2024-11-14 ENCOUNTER — LAB (OUTPATIENT)
Dept: LAB | Facility: LAB | Age: 39
End: 2024-11-14
Payer: COMMERCIAL

## 2024-11-14 VITALS — BODY MASS INDEX: 30.61 KG/M2 | HEIGHT: 67 IN | WEIGHT: 195 LBS

## 2024-11-14 DIAGNOSIS — D86.9 SARCOID: ICD-10-CM

## 2024-11-14 DIAGNOSIS — M05.9 SEROPOSITIVE RHEUMATOID ARTHRITIS: ICD-10-CM

## 2024-11-14 DIAGNOSIS — M05.9 SEROPOSITIVE RHEUMATOID ARTHRITIS: Primary | ICD-10-CM

## 2024-11-14 LAB
ALBUMIN SERPL BCP-MCNC: 3.8 G/DL (ref 3.4–5)
ALP SERPL-CCNC: 63 U/L (ref 33–110)
ALT SERPL W P-5'-P-CCNC: 16 U/L (ref 7–45)
ANION GAP SERPL CALC-SCNC: 14 MMOL/L (ref 10–20)
AST SERPL W P-5'-P-CCNC: 21 U/L (ref 9–39)
BASOPHILS # BLD AUTO: 0.04 X10*3/UL (ref 0–0.1)
BASOPHILS NFR BLD AUTO: 0.7 %
BILIRUB SERPL-MCNC: 0.4 MG/DL (ref 0–1.2)
BUN SERPL-MCNC: 12 MG/DL (ref 6–23)
CALCIUM SERPL-MCNC: 8.6 MG/DL (ref 8.6–10.6)
CHLORIDE SERPL-SCNC: 104 MMOL/L (ref 98–107)
CO2 SERPL-SCNC: 24 MMOL/L (ref 21–32)
CREAT SERPL-MCNC: 0.58 MG/DL (ref 0.5–1.05)
CRP SERPL-MCNC: 0.59 MG/DL
EGFRCR SERPLBLD CKD-EPI 2021: >90 ML/MIN/1.73M*2
EOSINOPHIL # BLD AUTO: 0.08 X10*3/UL (ref 0–0.7)
EOSINOPHIL NFR BLD AUTO: 1.4 %
ERYTHROCYTE [DISTWIDTH] IN BLOOD BY AUTOMATED COUNT: 13.1 % (ref 11.5–14.5)
ERYTHROCYTE [SEDIMENTATION RATE] IN BLOOD BY WESTERGREN METHOD: 30 MM/H (ref 0–20)
GLUCOSE SERPL-MCNC: 75 MG/DL (ref 74–99)
HCT VFR BLD AUTO: 36.1 % (ref 36–46)
HGB BLD-MCNC: 11.1 G/DL (ref 12–16)
IMM GRANULOCYTES # BLD AUTO: 0.01 X10*3/UL (ref 0–0.7)
IMM GRANULOCYTES NFR BLD AUTO: 0.2 % (ref 0–0.9)
LYMPHOCYTES # BLD AUTO: 2.21 X10*3/UL (ref 1.2–4.8)
LYMPHOCYTES NFR BLD AUTO: 39 %
MCH RBC QN AUTO: 29.1 PG (ref 26–34)
MCHC RBC AUTO-ENTMCNC: 30.7 G/DL (ref 32–36)
MCV RBC AUTO: 95 FL (ref 80–100)
MONOCYTES # BLD AUTO: 0.61 X10*3/UL (ref 0.1–1)
MONOCYTES NFR BLD AUTO: 10.8 %
NEUTROPHILS # BLD AUTO: 2.71 X10*3/UL (ref 1.2–7.7)
NEUTROPHILS NFR BLD AUTO: 47.9 %
NRBC BLD-RTO: 0 /100 WBCS (ref 0–0)
PLATELET # BLD AUTO: 233 X10*3/UL (ref 150–450)
POTASSIUM SERPL-SCNC: 3.9 MMOL/L (ref 3.5–5.3)
PROT SERPL-MCNC: 7.7 G/DL (ref 6.4–8.2)
RBC # BLD AUTO: 3.81 X10*6/UL (ref 4–5.2)
SODIUM SERPL-SCNC: 138 MMOL/L (ref 136–145)
WBC # BLD AUTO: 5.7 X10*3/UL (ref 4.4–11.3)

## 2024-11-14 PROCEDURE — 99213 OFFICE O/P EST LOW 20 MIN: CPT | Performed by: INTERNAL MEDICINE

## 2024-11-14 PROCEDURE — 85025 COMPLETE CBC W/AUTO DIFF WBC: CPT

## 2024-11-14 PROCEDURE — 36415 COLL VENOUS BLD VENIPUNCTURE: CPT

## 2024-11-14 PROCEDURE — 80053 COMPREHEN METABOLIC PANEL: CPT

## 2024-11-14 PROCEDURE — 86140 C-REACTIVE PROTEIN: CPT

## 2024-11-14 PROCEDURE — 3008F BODY MASS INDEX DOCD: CPT | Performed by: INTERNAL MEDICINE

## 2024-11-14 PROCEDURE — 85652 RBC SED RATE AUTOMATED: CPT

## 2024-11-14 ASSESSMENT — PAIN SCALES - GENERAL: PAINLEVEL_OUTOF10: 0-NO PAIN

## 2024-11-14 NOTE — PROGRESS NOTES
Subjective   Patient ID: David Snyder is a 39 y.o. female who presents for Follow-up (Rheumatoid arthriris).    HPI  David Snyder is a 38 yo F w/a history of severe erosive, seropositive RA and intrathoracic sarcoidosis presenting for follow-up. Initial diagnosis 06/2022. Last seen 05/2023 at which time she was started on LEF for persistent inflammatory joint pain and synovitis w/elevated inflammatory markers. Maintained on Humira and prednisone at that time. APLA checked given that she is using depo-provera for contraception. She has several positive autoantibodies (BEN 1:10,240, low-titer + Sm, +RNP, +SSA, +anti-chromatin) but no evidence of systemic CTD apart from RA.      Today patient states she is using Humira, LEF, and prednisone as prescribed. Missed 3 days of prednisone and didn't feel any different. Due for Humira this week and is out of refills. No injection site reactions w/Humira. No GI upset w/LEF. Overall feels good but still having pain and stiffness and in the AM. Stiffness lasts ~ 5 minutes. If she bumps her hands they will swell but otherwise no joint swelling. Knees hurt when it rains but otherwise no joint pain or stiffness. She had rhinorrhea for one week followed by dry cough that has lasted 2 weeks. Cough is worse at night when she's lying down. Denies fever. No other illness or infection. Feels Humira lasts ~ 7 days.      07/2024:  She tapered down her PRD to 5 mg daily  She has chronic joint problem, but no active joint pain.  Almost no AM stiffness  She denies any pulmonary symptoms  No skin rashes    11/24:  She is doing well, no active joint flare, swollen joint or AM stiffness  She has chronic joint changes  She tapered down her PRD to 4 mg daily     Current DMARDS:  - LEF 10mg daily  - Humira 40mg subcutaneous every other week (started 02/2023)  - Prednisone 4 mg daily     Prior immunosuppression:  - Methotrexate 6 tabs weekly (07/2022 - 09/2022), discontinue as patient didn't tolerate  folic acid d/t side effects  ROS  Joint pain in hands: negative   Joint swelling: negative  Morning stiffness and duration: negative   strength: normal  Oral ulcer: negative  Genital ulcer: negative  Raynaud phenomenon: negative  Chest pain/dyspnea: negative  Low back pain: negative  Visual problem: negative  Dry eyes/dry mouth: negative  Skin rash/scaling/psoriasis: negative       Objective     PEXAM  VS reviewed, WNL  General: Alert, no distress   HEENT: Normocephalic/atraumatic, No alopecia. PERRLA. Sclera white, conjunctiva pink, no malar rash. no oral or nasal ulcer. Oral cavity pink and moist, no erythema or exudate, dentition good.   Neck: supple  Respiratory: CTA B, no adventitious breath sounds  Cardiac: RRR, no murmurs, carotid, or bruits  Abdominal: symmetrical, soft, non-tender, non-distended, normoactive BSx4 quadrants, no CVA tenderness or suprapubic tenderness  MSK: Joints of upper and lower extremities were assessed for synovitis and ROM.    +ulnar deviation especially at right hand and boutounirer deformities in some fingers, B/L mild flexion   Extremities: no clubbing, no cyanosis, no edema  Skin: Skin warm and moist.   Neuro: non-focal, Strength 5/5 throughout. Normal gait. No cerebellar pathologic exam     Assessment/Plan   Seropositive rheumatoid arthritis (CMS/HCC)  She tapered down her PRD and no severe flare  PExam showed ulnar deviation especially at right hand and boutounirer deformities in some fingers, B/L mild flexion contracture, no active synovitis.  -will continue LEF 20 mg daily  -will taper her PRD to 3 mg daily  -will continue Humira once a week  -will see her in 4-5 months  Sarcoid is stable, no active pulmonary symptoms

## 2024-12-10 ENCOUNTER — SPECIALTY PHARMACY (OUTPATIENT)
Dept: PHARMACY | Facility: CLINIC | Age: 39
End: 2024-12-10

## 2024-12-10 PROCEDURE — RXMED WILLOW AMBULATORY MEDICATION CHARGE

## 2024-12-17 ENCOUNTER — PHARMACY VISIT (OUTPATIENT)
Dept: PHARMACY | Facility: CLINIC | Age: 39
End: 2024-12-17
Payer: MEDICAID

## 2025-01-06 ENCOUNTER — OFFICE VISIT (OUTPATIENT)
Dept: PRIMARY CARE | Facility: CLINIC | Age: 40
End: 2025-01-06
Payer: COMMERCIAL

## 2025-01-06 VITALS
RESPIRATION RATE: 16 BRPM | HEIGHT: 67 IN | OXYGEN SATURATION: 98 % | HEART RATE: 89 BPM | TEMPERATURE: 97.2 F | BODY MASS INDEX: 32.18 KG/M2 | WEIGHT: 205 LBS | DIASTOLIC BLOOD PRESSURE: 79 MMHG | SYSTOLIC BLOOD PRESSURE: 115 MMHG

## 2025-01-06 DIAGNOSIS — Z30.42 ENCOUNTER FOR SURVEILLANCE OF INJECTABLE CONTRACEPTIVE: ICD-10-CM

## 2025-01-06 DIAGNOSIS — Z30.42 ENCOUNTER FOR DEPO-PROVERA CONTRACEPTION: Primary | ICD-10-CM

## 2025-01-06 LAB
HIV 1+2 AB+HIV1 P24 AG SERPL QL IA: NONREACTIVE
PREGNANCY TEST URINE, POC: NEGATIVE
TREPONEMA PALLIDUM IGG+IGM AB [PRESENCE] IN SERUM OR PLASMA BY IMMUNOASSAY: NONREACTIVE

## 2025-01-06 PROCEDURE — 81025 URINE PREGNANCY TEST: CPT | Performed by: NURSE PRACTITIONER

## 2025-01-06 PROCEDURE — 87491 CHLMYD TRACH DNA AMP PROBE: CPT | Performed by: NURSE PRACTITIONER

## 2025-01-06 PROCEDURE — 96372 THER/PROPH/DIAG INJ SC/IM: CPT | Performed by: NURSE PRACTITIONER

## 2025-01-06 PROCEDURE — 3008F BODY MASS INDEX DOCD: CPT | Performed by: NURSE PRACTITIONER

## 2025-01-06 PROCEDURE — 86780 TREPONEMA PALLIDUM: CPT | Performed by: NURSE PRACTITIONER

## 2025-01-06 PROCEDURE — 87389 HIV-1 AG W/HIV-1&-2 AB AG IA: CPT | Performed by: NURSE PRACTITIONER

## 2025-01-06 PROCEDURE — 36415 COLL VENOUS BLD VENIPUNCTURE: CPT | Performed by: NURSE PRACTITIONER

## 2025-01-06 PROCEDURE — 99213 OFFICE O/P EST LOW 20 MIN: CPT | Performed by: NURSE PRACTITIONER

## 2025-01-06 PROCEDURE — 87661 TRICHOMONAS VAGINALIS AMPLIF: CPT | Performed by: NURSE PRACTITIONER

## 2025-01-06 PROCEDURE — 99213 OFFICE O/P EST LOW 20 MIN: CPT | Mod: 25 | Performed by: NURSE PRACTITIONER

## 2025-01-06 PROCEDURE — 2500000004 HC RX 250 GENERAL PHARMACY W/ HCPCS (ALT 636 FOR OP/ED): Mod: SE | Performed by: NURSE PRACTITIONER

## 2025-01-06 RX ORDER — MEDROXYPROGESTERONE ACETATE 150 MG/ML
150 INJECTION, SUSPENSION INTRAMUSCULAR ONCE
Status: COMPLETED | OUTPATIENT
Start: 2025-01-06 | End: 2025-01-06

## 2025-01-06 RX ADMIN — MEDROXYPROGESTERONE ACETATE 150 MG: 150 INJECTION, SUSPENSION INTRAMUSCULAR at 15:35

## 2025-01-06 ASSESSMENT — PATIENT HEALTH QUESTIONNAIRE - PHQ9
1. LITTLE INTEREST OR PLEASURE IN DOING THINGS: NOT AT ALL
2. FEELING DOWN, DEPRESSED OR HOPELESS: NOT AT ALL
SUM OF ALL RESPONSES TO PHQ9 QUESTIONS 1 AND 2: 0

## 2025-01-06 ASSESSMENT — ENCOUNTER SYMPTOMS
DEPRESSION: 0
OCCASIONAL FEELINGS OF UNSTEADINESS: 0
LOSS OF SENSATION IN FEET: 0

## 2025-01-06 ASSESSMENT — PAIN SCALES - GENERAL: PAINLEVEL_OUTOF10: 0-NO PAIN

## 2025-01-06 NOTE — PROGRESS NOTES
"Subjective   David Snyder is a 39 y.o. female who presents for Med Refill.  Med Refill    Ms. Snyder is a 40 yo F here today for follow up. Patient reports that she is interested in restarting depo for birth control and period management. Her LMP was 12/13/24. Notes that periods have been consistently beginning around the 13th of the month. She notes that she has previously tolerated depo well. Denies issues or concerns. She has a prescription for calcium supplementation and she has been taking it consistently. She would like standard STI screening.     All systems reviewed. Review of systems negative except for noted positives in HPI    Objective     /79   Pulse 89   Temp 36.2 °C (97.2 °F)   Resp 16   Ht 1.702 m (5' 7\")   Wt 93 kg (205 lb)   LMP 12/13/2024   SpO2 98%   BMI 32.11 kg/m²    Vital signs noted and reviewed.       Physical Exam  Constitutional:       Appearance: Normal appearance.   Cardiovascular:      Rate and Rhythm: Normal rate and regular rhythm.   Pulmonary:      Effort: Pulmonary effort is normal. No respiratory distress.      Breath sounds: Normal breath sounds.   Skin:     General: Skin is warm and dry.   Neurological:      Mental Status: She is oriented to person, place, and time.   Psychiatric:         Mood and Affect: Mood normal.           Assessment/Plan   Problem List Items Addressed This Visit    None  Visit Diagnoses       Encounter for Depo-Provera contraception    -  Primary    Relevant Orders    POCT Pregnancy, Urine manually resulted    Syphilis Screen with Reflex    C. trachomatis / N. gonorrhoeae, Amplified    Trichomonas vaginalis, Amplified    HIV 1/2 Antigen/Antibody Screen with Reflex to Confirmation                    "

## 2025-01-06 NOTE — PATIENT INSTRUCTIONS
Thank you for coming in for your visit today!    Please follow up in 12 weeks for next depo injection.    Today we restarted depo.  Be sure to take calcium supplementation daily.   It is important initially to use a back up form of birth control.  Today we completed blood work. We will contact you with any abnormalities from this testing.      Call us with any questions or concerns.     Call 911 or go to the emergency room if you have pain in your chest, difficulty breathing, or other life threatening symptoms.

## 2025-01-07 LAB
C TRACH RRNA SPEC QL NAA+PROBE: NEGATIVE
N GONORRHOEA DNA SPEC QL PROBE+SIG AMP: NEGATIVE
T VAGINALIS RRNA SPEC QL NAA+PROBE: NEGATIVE

## 2025-01-07 RX ORDER — CALCIUM CARBONATE/VITAMIN D3 600MG-5MCG
1 TABLET ORAL DAILY
Qty: 30 TABLET | Refills: 11 | Status: SHIPPED | OUTPATIENT
Start: 2025-01-07

## 2025-01-07 RX ORDER — CALCIUM CARBONATE/VITAMIN D3 600MG-5MCG
1 TABLET ORAL DAILY
Qty: 30 TABLET | Refills: 0 | Status: SHIPPED | OUTPATIENT
Start: 2025-01-07

## 2025-01-14 ENCOUNTER — APPOINTMENT (OUTPATIENT)
Dept: PRIMARY CARE | Facility: CLINIC | Age: 40
End: 2025-01-14
Payer: COMMERCIAL

## 2025-01-27 ENCOUNTER — SPECIALTY PHARMACY (OUTPATIENT)
Dept: PHARMACY | Facility: CLINIC | Age: 40
End: 2025-01-27

## 2025-01-27 PROCEDURE — RXMED WILLOW AMBULATORY MEDICATION CHARGE

## 2025-01-29 ENCOUNTER — PHARMACY VISIT (OUTPATIENT)
Dept: PHARMACY | Facility: CLINIC | Age: 40
End: 2025-01-29
Payer: MEDICAID

## 2025-02-17 DIAGNOSIS — Z30.42 ENCOUNTER FOR SURVEILLANCE OF INJECTABLE CONTRACEPTIVE: ICD-10-CM

## 2025-02-17 RX ORDER — CALCIUM CARBONATE/VITAMIN D3 600MG-5MCG
1 TABLET ORAL DAILY
Qty: 90 TABLET | Refills: 4 | Status: SHIPPED | OUTPATIENT
Start: 2025-02-17

## 2025-02-24 PROCEDURE — RXMED WILLOW AMBULATORY MEDICATION CHARGE

## 2025-03-03 ENCOUNTER — SPECIALTY PHARMACY (OUTPATIENT)
Dept: PHARMACY | Facility: CLINIC | Age: 40
End: 2025-03-03

## 2025-03-10 ENCOUNTER — PHARMACY VISIT (OUTPATIENT)
Dept: PHARMACY | Facility: CLINIC | Age: 40
End: 2025-03-10
Payer: MEDICAID

## 2025-03-20 ENCOUNTER — APPOINTMENT (OUTPATIENT)
Dept: RHEUMATOLOGY | Facility: CLINIC | Age: 40
End: 2025-03-20
Payer: COMMERCIAL

## 2025-03-20 VITALS
HEART RATE: 96 BPM | WEIGHT: 193 LBS | SYSTOLIC BLOOD PRESSURE: 105 MMHG | DIASTOLIC BLOOD PRESSURE: 82 MMHG | BODY MASS INDEX: 30.23 KG/M2

## 2025-03-20 DIAGNOSIS — M05.9 SEROPOSITIVE RHEUMATOID ARTHRITIS: Primary | ICD-10-CM

## 2025-03-20 DIAGNOSIS — D86.9 SARCOID: ICD-10-CM

## 2025-03-20 PROCEDURE — 1036F TOBACCO NON-USER: CPT | Performed by: INTERNAL MEDICINE

## 2025-03-20 PROCEDURE — 99213 OFFICE O/P EST LOW 20 MIN: CPT | Performed by: INTERNAL MEDICINE

## 2025-03-20 RX ORDER — LEFLUNOMIDE 20 MG/1
20 TABLET ORAL
Qty: 90 TABLET | Refills: 3 | Status: SHIPPED | OUTPATIENT
Start: 2025-03-20 | End: 2026-03-20

## 2025-03-20 RX ORDER — PREDNISONE 5 MG/1
10 TABLET ORAL DAILY
Qty: 60 TABLET | Refills: 0 | Status: SHIPPED | OUTPATIENT
Start: 2025-03-20 | End: 2025-04-19

## 2025-03-20 RX ORDER — ADALIMUMAB 40MG/0.4ML
1 KIT SUBCUTANEOUS
Qty: 12 EACH | Refills: 1 | Status: SHIPPED | OUTPATIENT
Start: 2025-03-23 | End: 2025-06-21

## 2025-03-20 RX ORDER — LEFLUNOMIDE 20 MG/1
1 TABLET ORAL
COMMUNITY
Start: 2024-11-30 | End: 2025-03-20 | Stop reason: SDUPTHER

## 2025-03-20 NOTE — PROGRESS NOTES
Subjective   Patient ID: David Snyder is a 40 y.o. female who presents for Follow-up.    HPI  David Snyder is a 40 yo F w/a history of severe erosive, seropositive RA and intrathoracic sarcoidosis presenting for follow-up. Initial diagnosis 06/2022. Last seen 05/2023 at which time she was started on LEF for persistent inflammatory joint pain and synovitis w/elevated inflammatory markers. Maintained on Humira and prednisone at that time. APLA checked given that she is using depo-provera for contraception. She has several positive autoantibodies (BEN 1:10,240, low-titer + Sm, +RNP, +SSA, +anti-chromatin) but no evidence of systemic CTD apart from RA.      Today patient states she is using Humira, LEF, and prednisone as prescribed. Missed 3 days of prednisone and didn't feel any different. Due for Humira this week and is out of refills. No injection site reactions w/Humira. No GI upset w/LEF. Overall feels good but still having pain and stiffness and in the AM. Stiffness lasts ~ 5 minutes. If she bumps her hands they will swell but otherwise no joint swelling. Knees hurt when it rains but otherwise no joint pain or stiffness. She had rhinorrhea for one week followed by dry cough that has lasted 2 weeks. Cough is worse at night when she's lying down. Denies fever. No other illness or infection. Feels Humira lasts ~ 7 days.      07/2024:  She tapered down her PRD to 5 mg daily  She has chronic joint problem, but no active joint pain.  Almost no AM stiffness  She denies any pulmonary symptoms  No skin rashes    11/24:  She is doing well, no active joint flare, swollen joint or AM stiffness  She has chronic joint changes  She tapered down her PRD to 4 mg daily    03/25:  She stopped PRD 2 weeks ago.  She reports mild joint pain in he rhands and right shoulder, but no severe flare after stopping PRD  Her AM stiffness lasts 10-15 min    Current DMARDS:  - LEF 20mg daily  - Humira 40mg subcutaneous every other week (started  02/2023)  - Prednisone stopped it in 01/25     Prior immunosuppression:  - Methotrexate 6 tabs weekly (07/2022 - 09/2022), discontinue as patient didn't tolerate folic acid d/t side effects    ROS  Joint pain in hands: negative   Joint swelling: negative  Morning stiffness and duration: negative   strength: normal  Oral ulcer: negative  Genital ulcer: negative  Raynaud phenomenon: negative  Chest pain/dyspnea: negative  Low back pain: negative  Visual problem: negative  Dry eyes/dry mouth: negative  Skin rash/scaling/psoriasis: negative       Objective     PEXAM  VS reviewed, WNL  General: Alert, no distress   HEENT: Normocephalic/atraumatic, No alopecia. PERRLA. Sclera white, conjunctiva pink, no malar rash. no oral or nasal ulcer. Oral cavity pink and moist, no erythema or exudate, dentition good.   Neck: supple  Respiratory: CTA B, no adventitious breath sounds  Cardiac: RRR, no murmurs, carotid, or bruits  Abdominal: symmetrical, soft, non-tender, non-distended, normoactive BSx4 quadrants, no CVA tenderness or suprapubic tenderness  MSK: Joints of upper and lower extremities were assessed for synovitis and ROM.    +ulnar deviation especially at right hand and boutounirer deformities in some fingers, B/L mild flexion   Extremities: no clubbing, no cyanosis, no edema  Skin: Skin warm and moist.   Neuro: non-focal, Strength 5/5 throughout. Normal gait. No cerebellar pathologic exam     Assessment/Plan   Seropositive rheumatoid arthritis (CMS/HCC)  She stopped her PRD 2 weeks ago and no severe flare  PExam showed ulnar deviation especially at right hand and boutounirer deformities in some fingers, B/L mild flexion contracture, no active synovitis.  -will continue LEF 20 mg daily  -will see her labs  -will continue Humira once a week  -will see her in 4-5 months  Sarcoid is stable, no active pulmonary symptoms

## 2025-03-21 LAB
ALBUMIN SERPL-MCNC: 3.9 G/DL (ref 3.6–5.1)
ALP SERPL-CCNC: 61 U/L (ref 31–125)
ALT SERPL-CCNC: 19 U/L (ref 6–29)
ANION GAP SERPL CALCULATED.4IONS-SCNC: 12 MMOL/L (CALC) (ref 7–17)
AST SERPL-CCNC: 29 U/L (ref 10–30)
BASOPHILS # BLD AUTO: 40 CELLS/UL (ref 0–200)
BASOPHILS NFR BLD AUTO: 0.7 %
BILIRUB SERPL-MCNC: 0.4 MG/DL (ref 0.2–1.2)
BUN SERPL-MCNC: 11 MG/DL (ref 7–25)
CALCIUM SERPL-MCNC: 9.1 MG/DL (ref 8.6–10.2)
CHLORIDE SERPL-SCNC: 107 MMOL/L (ref 98–110)
CO2 SERPL-SCNC: 21 MMOL/L (ref 20–32)
CREAT SERPL-MCNC: 0.56 MG/DL (ref 0.5–0.97)
CRP SERPL-MCNC: 8.4 MG/L
EGFRCR SERPLBLD CKD-EPI 2021: 120 ML/MIN/1.73M2
EOSINOPHIL # BLD AUTO: 80 CELLS/UL (ref 15–500)
EOSINOPHIL NFR BLD AUTO: 1.4 %
ERYTHROCYTE [DISTWIDTH] IN BLOOD BY AUTOMATED COUNT: 11.9 % (ref 11–15)
ERYTHROCYTE [SEDIMENTATION RATE] IN BLOOD BY WESTERGREN METHOD: 62 MM/H
GLUCOSE SERPL-MCNC: 102 MG/DL (ref 65–99)
HCT VFR BLD AUTO: 37.7 % (ref 35–45)
HGB BLD-MCNC: 12 G/DL (ref 11.7–15.5)
LYMPHOCYTES # BLD AUTO: 1590 CELLS/UL (ref 850–3900)
LYMPHOCYTES NFR BLD AUTO: 27.9 %
MCH RBC QN AUTO: 29.6 PG (ref 27–33)
MCHC RBC AUTO-ENTMCNC: 31.8 G/DL (ref 32–36)
MCV RBC AUTO: 93.1 FL (ref 80–100)
MONOCYTES # BLD AUTO: 376 CELLS/UL (ref 200–950)
MONOCYTES NFR BLD AUTO: 6.6 %
NEUTROPHILS # BLD AUTO: 3614 CELLS/UL (ref 1500–7800)
NEUTROPHILS NFR BLD AUTO: 63.4 %
PLATELET # BLD AUTO: 224 THOUSAND/UL (ref 140–400)
PMV BLD REES-ECKER: 12.7 FL (ref 7.5–12.5)
POTASSIUM SERPL-SCNC: 4.3 MMOL/L (ref 3.5–5.3)
PROT SERPL-MCNC: 8.3 G/DL (ref 6.1–8.1)
RBC # BLD AUTO: 4.05 MILLION/UL (ref 3.8–5.1)
SODIUM SERPL-SCNC: 140 MMOL/L (ref 135–146)
WBC # BLD AUTO: 5.7 THOUSAND/UL (ref 3.8–10.8)

## 2025-03-22 ENCOUNTER — SPECIALTY PHARMACY (OUTPATIENT)
Dept: PHARMACY | Facility: CLINIC | Age: 40
End: 2025-03-22

## 2025-03-22 PROCEDURE — RXMED WILLOW AMBULATORY MEDICATION CHARGE

## 2025-03-25 ENCOUNTER — PHARMACY VISIT (OUTPATIENT)
Dept: PHARMACY | Facility: CLINIC | Age: 40
End: 2025-03-25
Payer: MEDICAID

## 2025-04-23 ENCOUNTER — SPECIALTY PHARMACY (OUTPATIENT)
Dept: PHARMACY | Facility: CLINIC | Age: 40
End: 2025-04-23

## 2025-04-29 ENCOUNTER — SPECIALTY PHARMACY (OUTPATIENT)
Dept: PHARMACY | Facility: CLINIC | Age: 40
End: 2025-04-29

## 2025-04-30 DIAGNOSIS — M05.9 SEROPOSITIVE RHEUMATOID ARTHRITIS: ICD-10-CM

## 2025-04-30 RX ORDER — PREDNISONE 5 MG/1
10 TABLET ORAL DAILY
Qty: 60 TABLET | Refills: 0 | Status: SHIPPED | OUTPATIENT
Start: 2025-04-30

## 2025-05-05 ENCOUNTER — SPECIALTY PHARMACY (OUTPATIENT)
Dept: PHARMACY | Facility: CLINIC | Age: 40
End: 2025-05-05

## 2025-05-05 PROCEDURE — RXMED WILLOW AMBULATORY MEDICATION CHARGE

## 2025-05-06 ENCOUNTER — TELEMEDICINE CLINICAL SUPPORT (OUTPATIENT)
Dept: PHARMACY | Facility: HOSPITAL | Age: 40
End: 2025-05-06
Payer: COMMERCIAL

## 2025-05-06 NOTE — PROGRESS NOTES
[facility] Clinical Note  Patient Reassessment     Introduction  David Snyder is a 40 y.o. female who is on the specialty pharmacy service for management of rheumatoid arthritis.    Rheumatology Core Program     [facility] supplied medication: Humira (adalimumab) 40mg/0.4mL pens under the skin once every other week for rheumatoid arthritis      Duration of therapy: Maintenance    The most recent encounter visit with the referring prescriber Dr. Jony Tucker on 3/20/2025 was reviewed.  Pharmacy will continue to collaborate in the care of this patient with the referring prescriber.    Discussion  David was contacted on 5/6/2025 at 12:53 PM for a pharmacy visit with encounter number [id number] from:   [facility]  [id number]  [facility] [address]  [place] [address]  Dept: [phone]  Dept Fax: [phone]  Loc: [phone]  David consented to a/an Telephone visit, which was performed.    Efficacy  Patient has developed new symptoms of condition: Yes - pt reports increased pain and stiffness in her legs (at night) that started after she stopped taking prednisone. She has been weaning off of it. Now she is back on 5mg daily tablets and is trying to wean off of the prednisone slowly again.   Patient/caregiver feels medication is affecting the disease state: Patient reports feeling her symptoms more when she is not taking any prednisone. Discussed possibly switching medications or increasing the Humira to weekly injections. Will send a message to Dr. Tucker to see what he would like to do. Pt's next apt is not until Aug 2025.     Goals  Provided education on goals and possible outcomes of therapy:  Adherence with therapy  Timely completion of appropriate labs  Timely and appropriate follow up with provider  Identify and address medication interactions with presciption medications, OTC medications and supplements  Optimize or maintain quality of  life  Rheumatology: Remission or low disease activity  Reduction of inflammation, joint pain, swelling, and morning stiffness  Patient has documented target(s) for goals of therapy: Yes  Patient status for goal(s): Off track - will reach out to the provider and let him know    Targets       Target Due Completed Completed By Outcome Source     Goal:  Prevent and reduce disease flares 11/6/2025 -- -- -- --         Goal: Remission or low disease activity 11/6/2025 -- -- -- --         Goal:  Prevent and reduce disease flares 11/6/2025 5/6/2025 Ashlee Chris PharmD On Track --    Patient reports feeling worse when she is not taking prednisone, has tried to wean off of it and has noticed worsening pain/stiffness in her legs.  Has not experienced flares, just some increased disease activity off of the prednisone       Goal: Remission or low disease activity 11/6/2025 5/6/2025 Ashlee Chris PharmD Off Track --    Patient reports feeling worse when she is not taking prednisone, has tried to wean off of it and has noticed worsening pain/stiffness in her legs.              Tolerance  Patient has experienced side effects from this medication: No  Changes to current therapy regimen: No    The follow-up timeline was discussed. Every person responds to and reacts to therapy differently. Patient should be assessed for efficacy and tolerability in approximately: 6 months            Adherence  Patient Information  Informant: Self (Patient)  Demonstrates Understanding of Importance of Adherence: Yes  Does the patient have any barriers to self-administration (including physical and mental?): No  Medication Information  Medication: adalimumab (Humira(CF) Pen)  Patient Reported Missed Doses in the Last 4 Weeks: 0  Estimated Medication Adherence Level: Good  Barriers to Adherence: No Problems identified   The importance of adherence was discussed and patient/caregiver was advised to take the medication as prescribed by their  provider. Encouraged patient/caregiver to call physician's office or specialty pharmacy if they have a question regarding a missed dose.    General Assessment  Changes to home medications, OTCs or supplements: Yes - started taking jose pill supplements (seem to be helping)  Current Medications[1]  Reported new allergies: No  Reported new medical conditions: No  Additional monitoring reviewed: Rheumatology- CBC-diff:   Lab Results   Component Value Date    WBC 5.7 03/20/2025    RBC 4.05 03/20/2025    HGB 12.0 03/20/2025    HCT 37.7 03/20/2025    MCV 93.1 03/20/2025    MCHC 31.8 (L) 03/20/2025     03/20/2025    RDW 11.9 03/20/2025    NEUTOPHILPCT 47.9 11/14/2024    IGPCT 0.2 11/14/2024    LYMPHOPCT 27.9 03/20/2025    MONOPCT 6.6 03/20/2025    EOSPCT 1.4 03/20/2025    BASOPCT 0.7 03/20/2025    NEUTROABS 2.71 11/14/2024    LYMPHSABS 2.21 11/14/2024    MONOSABS 0.61 11/14/2024    EOSABS 80 03/20/2025    BASOSABS 40 03/20/2025    and CMP:   Lab Results   Component Value Date    GLUCOSE 102 (H) 03/20/2025     03/20/2025    K 4.3 03/20/2025     03/20/2025    CO2 21 03/20/2025    ANIONGAP 12 03/20/2025    BUN 11 03/20/2025    CREATININE 0.56 03/20/2025    GFRF >90 05/17/2023    CALCIUM 9.1 03/20/2025    ALBUMIN 3.9 03/20/2025    ALKPHOS 61 03/20/2025    PROT 8.3 (H) 03/20/2025    AST 29 03/20/2025    BILITOT 0.4 03/20/2025    ALT 19 03/20/2025     Is laboratory follow up needed? No - per provider    Advised to contact the pharmacy if there are any changes to the patient's medication list, including prescriptions, OTC medications, herbal products, or supplements.    Impression/Plan  This patient has not been identified as high risk due to Lack of high risk qualifiers.  The following action was taken:N/A    Patient reports increased symptoms of RA when she is not taking any prednisone. Will message Dr. Tucker to see if he would like to increase Humira to weekly injections for better disease control or if he  would like to switch pt to another agent. Her next follow up is not until Aug 2025. Also discussed the possibility of having to switch to a Humira biosimilar because of insurance changes. Pt is aware.     QOL/Patient Satisfaction  Rate your quality of life on scale of 1-10: 6 (without the prednisone, pt reports a 6)  Rate your satisfaction with  Specialty Pharmacy on scale of 1-10: 10 - Completely satisfied    Provided contact information ([phone]) for [facility] and reviewed dispensing process, refill timeline and patient management follow up. Confirmed understanding of education conducted during assessment. All questions and concerns were addressed and patient/caregiver was encouraged to reach out for additional questions or concerns.    Based on the patient's diagnosis, medication list, progress towards goals, adherence, tolerance, and medication list, medication remains appropriate: Therapy remains appropriate with modifications or additional outreach; provider outreach/MTP documented    Ashlee Chris, OfeliaD       [1]   Current Outpatient Medications   Medication Sig Dispense Refill    adalimumab (Humira,CF, Pen) 40 mg/0.4 mL pen injector kit pen-injector Inject 40 mg (1 pen) under the skin 1 (one) time per week 12 each 1    calcium carbonate-vitamin D3 600 mg-5 mcg (200 unit) tablet Take 1 tablet by mouth once daily. 30 tablet 0    calcium carbonate-vitamin D3 600 mg-5 mcg (200 unit) tablet TAKE 1 TABLET BY MOUTH EVERY DAY 90 tablet 4    calcium carbonate/vitamin D3 (CALCIUM + D ORAL) Take 1 tablet by mouth once daily.      famotidine (Pepcid) 20 mg tablet TAKE 1 TABLET BY MOUTH EVERYDAY AT BEDTIME 30 tablet 5    leflunomide (Arava) 20 mg tablet Take 1 tablet (20 mg) by mouth early in the morning.. 90 tablet 3    predniSONE (Deltasone) 5 mg tablet TAKE 2 TABLETS BY MOUTH ONCE DAILY. 60 tablet 0     No current facility-administered medications for this visit.

## 2025-05-07 ENCOUNTER — SPECIALTY PHARMACY (OUTPATIENT)
Dept: PHARMACY | Facility: CLINIC | Age: 40
End: 2025-05-07

## 2025-05-12 ENCOUNTER — PHARMACY VISIT (OUTPATIENT)
Dept: PHARMACY | Facility: CLINIC | Age: 40
End: 2025-05-12
Payer: MEDICAID

## 2025-06-01 DIAGNOSIS — M05.9 SEROPOSITIVE RHEUMATOID ARTHRITIS: ICD-10-CM

## 2025-06-02 RX ORDER — PREDNISONE 5 MG/1
10 TABLET ORAL DAILY
Qty: 60 TABLET | Refills: 0 | Status: SHIPPED | OUTPATIENT
Start: 2025-06-02

## 2025-06-09 ENCOUNTER — SPECIALTY PHARMACY (OUTPATIENT)
Dept: PHARMACY | Facility: CLINIC | Age: 40
End: 2025-06-09

## 2025-06-19 ENCOUNTER — SPECIALTY PHARMACY (OUTPATIENT)
Dept: PHARMACY | Facility: CLINIC | Age: 40
End: 2025-06-19

## 2025-08-21 ENCOUNTER — APPOINTMENT (OUTPATIENT)
Dept: RHEUMATOLOGY | Facility: CLINIC | Age: 40
End: 2025-08-21
Payer: COMMERCIAL